# Patient Record
Sex: MALE | Race: WHITE | NOT HISPANIC OR LATINO | URBAN - METROPOLITAN AREA
[De-identification: names, ages, dates, MRNs, and addresses within clinical notes are randomized per-mention and may not be internally consistent; named-entity substitution may affect disease eponyms.]

---

## 2017-10-02 ENCOUNTER — IMPORTED ENCOUNTER (OUTPATIENT)
Dept: URBAN - METROPOLITAN AREA CLINIC 50 | Facility: CLINIC | Age: 67
End: 2017-10-02

## 2018-10-01 ENCOUNTER — IMPORTED ENCOUNTER (OUTPATIENT)
Dept: URBAN - METROPOLITAN AREA CLINIC 50 | Facility: CLINIC | Age: 68
End: 2018-10-01

## 2019-10-07 ENCOUNTER — IMPORTED ENCOUNTER (OUTPATIENT)
Dept: URBAN - METROPOLITAN AREA CLINIC 50 | Facility: CLINIC | Age: 69
End: 2019-10-07

## 2019-10-10 ENCOUNTER — IMPORTED ENCOUNTER (OUTPATIENT)
Dept: URBAN - METROPOLITAN AREA CLINIC 50 | Facility: CLINIC | Age: 69
End: 2019-10-10

## 2020-01-07 ENCOUNTER — IMPORTED ENCOUNTER (OUTPATIENT)
Dept: URBAN - METROPOLITAN AREA CLINIC 50 | Facility: CLINIC | Age: 70
End: 2020-01-07

## 2020-01-08 ENCOUNTER — IMPORTED ENCOUNTER (OUTPATIENT)
Dept: URBAN - METROPOLITAN AREA CLINIC 50 | Facility: CLINIC | Age: 70
End: 2020-01-08

## 2020-01-14 ENCOUNTER — IMPORTED ENCOUNTER (OUTPATIENT)
Dept: URBAN - METROPOLITAN AREA CLINIC 50 | Facility: CLINIC | Age: 70
End: 2020-01-14

## 2020-01-23 ENCOUNTER — IMPORTED ENCOUNTER (OUTPATIENT)
Dept: URBAN - METROPOLITAN AREA CLINIC 50 | Facility: CLINIC | Age: 70
End: 2020-01-23

## 2020-01-23 NOTE — PATIENT DISCUSSION
"""S/P IOL OD: Tecnis ZCB00 15.0 (ORA) (Target: [place]) +ORA/Femto/Arcs +Omidria. Continue post operative instructions and drops per schedule.  """

## 2020-01-27 ENCOUNTER — IMPORTED ENCOUNTER (OUTPATIENT)
Dept: URBAN - METROPOLITAN AREA CLINIC 50 | Facility: CLINIC | Age: 70
End: 2020-01-27

## 2020-01-27 NOTE — PATIENT DISCUSSION
"""S/P IOL OD: Tecnis ZCB00 15.0 (ORA) (Target: [place]) +ORA/Femto/Arcs +Omidria. Continue post operative instructions and drops per schedule.  """

## 2020-02-04 ENCOUNTER — IMPORTED ENCOUNTER (OUTPATIENT)
Dept: URBAN - METROPOLITAN AREA CLINIC 50 | Facility: CLINIC | Age: 70
End: 2020-02-04

## 2020-02-13 ENCOUNTER — IMPORTED ENCOUNTER (OUTPATIENT)
Dept: URBAN - METROPOLITAN AREA CLINIC 50 | Facility: CLINIC | Age: 70
End: 2020-02-13

## 2020-02-13 NOTE — PATIENT DISCUSSION
"""S/P IOL OS: Tecnis [id number] 14. 0(ORA) @ 5Âº (Target: [place]) +ORA/Femto +Omidria.  Continue ""

## 2020-02-19 ENCOUNTER — IMPORTED ENCOUNTER (OUTPATIENT)
Dept: URBAN - METROPOLITAN AREA CLINIC 50 | Facility: CLINIC | Age: 70
End: 2020-02-19

## 2020-06-15 ENCOUNTER — IMPORTED ENCOUNTER (OUTPATIENT)
Dept: URBAN - METROPOLITAN AREA CLINIC 50 | Facility: CLINIC | Age: 70
End: 2020-06-15

## 2021-05-14 ASSESSMENT — TONOMETRY
OD_IOP_MMHG: 16
OD_IOP_MMHG: 18
OS_IOP_MMHG: 17
OS_IOP_MMHG: 18
OS_IOP_MMHG: 13
OD_IOP_MMHG: 13
OS_IOP_MMHG: 18
OD_IOP_MMHG: 18
OS_IOP_MMHG: 14
OD_IOP_MMHG: 18
OD_IOP_MMHG: 19
OS_IOP_MMHG: 18
OD_IOP_MMHG: 19
OD_IOP_MMHG: 19
OS_IOP_MMHG: 17
OS_IOP_MMHG: 20

## 2021-05-14 ASSESSMENT — VISUAL ACUITY
OS_BAT: 20/40
OD_OTHER: 20/70. 20/400.
OS_BAT: 20/40
OD_CC: J1+@ 16 IN
OD_SC: 20/25-1
OS_CC: J1+@ 14 IN
OS_BAT: 20/60
OD_CC: J1+@ 17 IN
OD_BAT: 20/70
OD_SC: 20/50+2
OS_BAT: 20/20
OD_BAT: 20/100
OD_PH: 20/30-2
OS_CC: J1+@ 19 IN
OS_CC: 20/200
OS_PH: 20/80
OD_CC: 20/40
OS_CC: J1+@ 16 IN
OD_OTHER: 20/25. 20/50.
OD_BAT: 20/70
OS_CC: 20/30+2
OD_OTHER: 20/70. 20/400.
OS_SC: 20/25
OD_CC: J1+@ 19 IN
OS_CC: 20/30+1
OS_OTHER: 20/40. 20/50.
OS_OTHER: 20/60. >20/400.
OS_BAT: 20/40
OS_CC: J1+@ 14 IN
OD_PH: 20/40
OS_OTHER: 20/40. 20/40.
OD_CC: 20/30+1
OD_CC: 20/30-
OS_CC: J1+@ 16 IN
OS_SC: 20/50
OD_CC: 20/30-1+2
OD_CC: J1+@ 14 IN
OD_OTHER: 20/100. >20/400.
OD_SC: 20/25
OS_OTHER: 20/40. 20/40.
OS_CC: 20/25-
OS_CC: J1+@ 17 IN
OS_OTHER: 20/20. 20/40.
OS_PH: 20/25-1
OD_SC: 20/25-2
OD_PH: 20/25-2
OD_CC: J1+@ 14 IN
OD_BAT: 20/25
OS_SC: 20/30+2
OS_PH: 20/25
OD_CC: J1+@ 16 IN
OS_CC: 20/30-1+3

## 2021-06-19 ENCOUNTER — PREPPED CHART (OUTPATIENT)
Dept: URBAN - METROPOLITAN AREA CLINIC 50 | Facility: CLINIC | Age: 71
End: 2021-06-19

## 2021-06-21 ENCOUNTER — COMPREHENSIVE EXAM (OUTPATIENT)
Dept: URBAN - METROPOLITAN AREA CLINIC 50 | Facility: CLINIC | Age: 71
End: 2021-06-21

## 2021-06-21 DIAGNOSIS — H35.373: ICD-10-CM

## 2021-06-21 DIAGNOSIS — H16.223: ICD-10-CM

## 2021-06-21 DIAGNOSIS — H35.363: ICD-10-CM

## 2021-06-21 DIAGNOSIS — H43.813: ICD-10-CM

## 2021-06-21 DIAGNOSIS — H52.13: ICD-10-CM

## 2021-06-21 PROCEDURE — 92014 COMPRE OPH EXAM EST PT 1/>: CPT

## 2021-06-21 PROCEDURE — 92134 CPTRZ OPH DX IMG PST SGM RTA: CPT

## 2021-06-21 PROCEDURE — 92015 DETERMINE REFRACTIVE STATE: CPT

## 2021-06-21 ASSESSMENT — KERATOMETRY
OS_K2POWER_DIOPTERS: 45.75
OS_AXISANGLE_DEGREES: 004
OD_K2POWER_DIOPTERS: 45.50
OS_AXISANGLE2_DEGREES: 94
OD_AXISANGLE2_DEGREES: 90
OS_K1POWER_DIOPTERS: 46.25
OD_AXISANGLE_DEGREES: 180
OD_K1POWER_DIOPTERS: 45.50

## 2021-06-21 ASSESSMENT — VISUAL ACUITY
OU_CC: J1+
OD_GLARE: 20/70
OD_CC: J1+
OS_GLARE: 20/30
OD_SC: 20/30
OS_CC: J1+
OS_PH: 20/25-2
OS_SC: 20/30
OD_GLARE: 20/40
OS_GLARE: 20/40

## 2021-06-21 ASSESSMENT — TONOMETRY
OS_IOP_MMHG: 18
OD_IOP_MMHG: 18

## 2021-06-21 NOTE — PATIENT DISCUSSION
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 Recommend executive progressive.

## 2022-06-20 ENCOUNTER — COMPREHENSIVE EXAM (OUTPATIENT)
Dept: URBAN - METROPOLITAN AREA CLINIC 50 | Facility: CLINIC | Age: 72
End: 2022-06-20

## 2022-06-20 DIAGNOSIS — H16.223: ICD-10-CM

## 2022-06-20 DIAGNOSIS — H52.4: ICD-10-CM

## 2022-06-20 DIAGNOSIS — H43.813: ICD-10-CM

## 2022-06-20 DIAGNOSIS — H52.203: ICD-10-CM

## 2022-06-20 DIAGNOSIS — H35.373: ICD-10-CM

## 2022-06-20 DIAGNOSIS — H52.13: ICD-10-CM

## 2022-06-20 DIAGNOSIS — H35.363: ICD-10-CM

## 2022-06-20 PROCEDURE — 92014 COMPRE OPH EXAM EST PT 1/>: CPT

## 2022-06-20 PROCEDURE — 92015 DETERMINE REFRACTIVE STATE: CPT

## 2022-06-20 PROCEDURE — 92134 CPTRZ OPH DX IMG PST SGM RTA: CPT

## 2022-06-20 ASSESSMENT — VISUAL ACUITY
OS_GLARE: 20/40
OU_SC: 20/20
OS_GLARE: 20/25
OD_SC: 20/25
OD_GLARE: 20/25
OU_CC: J1+
OS_SC: 20/20
OD_GLARE: 20/40

## 2022-06-20 ASSESSMENT — KERATOMETRY
OD_K1POWER_DIOPTERS: 45.50
OS_K2POWER_DIOPTERS: 45.75
OS_AXISANGLE2_DEGREES: 94
OS_K1POWER_DIOPTERS: 46.25
OD_K2POWER_DIOPTERS: 45.50
OS_AXISANGLE_DEGREES: 004
OD_AXISANGLE_DEGREES: 180
OD_AXISANGLE2_DEGREES: 90

## 2022-06-20 ASSESSMENT — TONOMETRY
OD_IOP_MMHG: 17
OS_IOP_MMHG: 16